# Patient Record
Sex: MALE | Race: WHITE | NOT HISPANIC OR LATINO | URBAN - METROPOLITAN AREA
[De-identification: names, ages, dates, MRNs, and addresses within clinical notes are randomized per-mention and may not be internally consistent; named-entity substitution may affect disease eponyms.]

---

## 2017-09-01 ENCOUNTER — HOSPITAL ENCOUNTER (OUTPATIENT)
Facility: MEDICAL CENTER | Age: 67
End: 2017-09-01

## 2017-09-01 ENCOUNTER — RESOLUTE PROFESSIONAL BILLING HOSPITAL PROF FEE (OUTPATIENT)
Dept: HOSPITALIST | Facility: MEDICAL CENTER | Age: 67
End: 2017-09-01
Payer: COMMERCIAL

## 2017-09-01 ENCOUNTER — HOSPITAL ENCOUNTER (INPATIENT)
Facility: MEDICAL CENTER | Age: 67
LOS: 1 days | DRG: 086 | End: 2017-09-02
Attending: HOSPITALIST | Admitting: NEUROLOGICAL SURGERY
Payer: COMMERCIAL

## 2017-09-01 DIAGNOSIS — R53.1 GENERALIZED WEAKNESS: ICD-10-CM

## 2017-09-01 PROCEDURE — A9270 NON-COVERED ITEM OR SERVICE: HCPCS | Performed by: HOSPITALIST

## 2017-09-01 PROCEDURE — 770001 HCHG ROOM/CARE - MED/SURG/GYN PRIV*

## 2017-09-01 PROCEDURE — 700102 HCHG RX REV CODE 250 W/ 637 OVERRIDE(OP): Performed by: HOSPITALIST

## 2017-09-01 PROCEDURE — 99223 1ST HOSP IP/OBS HIGH 75: CPT | Performed by: HOSPITALIST

## 2017-09-01 RX ORDER — HYDROCODONE BITARTRATE AND ACETAMINOPHEN 5; 325 MG/1; MG/1
1-2 TABLET ORAL EVERY 6 HOURS PRN
Status: DISCONTINUED | OUTPATIENT
Start: 2017-09-01 | End: 2017-09-02

## 2017-09-01 RX ORDER — HYDROCORTISONE AND ACETIC ACID 20.75; 10.375 MG/ML; MG/ML
2 SOLUTION AURICULAR (OTIC) PRN
COMMUNITY

## 2017-09-01 RX ORDER — MORPHINE SULFATE 4 MG/ML
1 INJECTION, SOLUTION INTRAMUSCULAR; INTRAVENOUS EVERY 4 HOURS PRN
Status: DISCONTINUED | OUTPATIENT
Start: 2017-09-01 | End: 2017-09-02

## 2017-09-01 RX ORDER — LISINOPRIL 20 MG/1
20 TABLET ORAL DAILY
Status: DISCONTINUED | OUTPATIENT
Start: 2017-09-01 | End: 2017-09-02 | Stop reason: HOSPADM

## 2017-09-01 RX ORDER — LEVETIRACETAM 500 MG/1
500 TABLET ORAL 2 TIMES DAILY
Status: DISCONTINUED | OUTPATIENT
Start: 2017-09-01 | End: 2017-09-02 | Stop reason: HOSPADM

## 2017-09-01 RX ORDER — BISACODYL 10 MG
10 SUPPOSITORY, RECTAL RECTAL
Status: DISCONTINUED | OUTPATIENT
Start: 2017-09-01 | End: 2017-09-02 | Stop reason: HOSPADM

## 2017-09-01 RX ORDER — ASPIRIN 81 MG/1
81 TABLET, CHEWABLE ORAL DAILY
COMMUNITY

## 2017-09-01 RX ORDER — LISINOPRIL 20 MG/1
20 TABLET ORAL DAILY
COMMUNITY

## 2017-09-01 RX ORDER — ONDANSETRON 4 MG/1
4 TABLET, ORALLY DISINTEGRATING ORAL EVERY 4 HOURS PRN
Status: DISCONTINUED | OUTPATIENT
Start: 2017-09-01 | End: 2017-09-02 | Stop reason: HOSPADM

## 2017-09-01 RX ORDER — LATANOPROST 50 UG/ML
1 SOLUTION/ DROPS OPHTHALMIC NIGHTLY
COMMUNITY

## 2017-09-01 RX ORDER — ONDANSETRON 2 MG/ML
4 INJECTION INTRAMUSCULAR; INTRAVENOUS EVERY 4 HOURS PRN
Status: DISCONTINUED | OUTPATIENT
Start: 2017-09-01 | End: 2017-09-02 | Stop reason: HOSPADM

## 2017-09-01 RX ORDER — CLONIDINE HYDROCHLORIDE 0.1 MG/1
0.1 TABLET ORAL 4 TIMES DAILY PRN
Status: DISCONTINUED | OUTPATIENT
Start: 2017-09-01 | End: 2017-09-02

## 2017-09-01 RX ORDER — SODIUM PHOSPHATE,MONO-DIBASIC 19G-7G/118
500 ENEMA (ML) RECTAL
COMMUNITY

## 2017-09-01 RX ORDER — POLYETHYLENE GLYCOL 3350 17 G/17G
1 POWDER, FOR SOLUTION ORAL
Status: DISCONTINUED | OUTPATIENT
Start: 2017-09-01 | End: 2017-09-02 | Stop reason: HOSPADM

## 2017-09-01 RX ORDER — AMOXICILLIN 250 MG
2 CAPSULE ORAL 2 TIMES DAILY
Status: DISCONTINUED | OUTPATIENT
Start: 2017-09-01 | End: 2017-09-02 | Stop reason: HOSPADM

## 2017-09-01 RX ADMIN — STANDARDIZED SENNA CONCENTRATE AND DOCUSATE SODIUM 2 TABLET: 8.6; 5 TABLET, FILM COATED ORAL at 21:44

## 2017-09-01 RX ADMIN — LEVETIRACETAM 500 MG: 500 TABLET, FILM COATED ORAL at 21:44

## 2017-09-01 RX ADMIN — LISINOPRIL 20 MG: 20 TABLET ORAL at 21:44

## 2017-09-01 ASSESSMENT — LIFESTYLE VARIABLES
TOTAL SCORE: 0
ALCOHOL_USE: YES
AVERAGE NUMBER OF DAYS PER WEEK YOU HAVE A DRINK CONTAINING ALCOHOL: 1
HOW MANY TIMES IN THE PAST YEAR HAVE YOU HAD 5 OR MORE DRINKS IN A DAY: 0
EVER FELT BAD OR GUILTY ABOUT YOUR DRINKING: NO
ON A TYPICAL DAY WHEN YOU DRINK ALCOHOL HOW MANY DRINKS DO YOU HAVE: 2
HAVE YOU EVER FELT YOU SHOULD CUT DOWN ON YOUR DRINKING: NO
TOTAL SCORE: 0
HAVE PEOPLE ANNOYED YOU BY CRITICIZING YOUR DRINKING: NO
TOTAL SCORE: 0
EVER HAD A DRINK FIRST THING IN THE MORNING TO STEADY YOUR NERVES TO GET RID OF A HANGOVER: NO
CONSUMPTION TOTAL: NEGATIVE
EVER_SMOKED: NEVER

## 2017-09-01 ASSESSMENT — ENCOUNTER SYMPTOMS
NEUROLOGICAL NEGATIVE: 1
GASTROINTESTINAL NEGATIVE: 1
EYES NEGATIVE: 1
MUSCULOSKELETAL NEGATIVE: 1
RESPIRATORY NEGATIVE: 1
CARDIOVASCULAR NEGATIVE: 1
CONSTITUTIONAL NEGATIVE: 1

## 2017-09-01 ASSESSMENT — PATIENT HEALTH QUESTIONNAIRE - PHQ9
1. LITTLE INTEREST OR PLEASURE IN DOING THINGS: NOT AT ALL
SUM OF ALL RESPONSES TO PHQ9 QUESTIONS 1 AND 2: 0
SUM OF ALL RESPONSES TO PHQ QUESTIONS 1-9: 0
2. FEELING DOWN, DEPRESSED, IRRITABLE, OR HOPELESS: NOT AT ALL

## 2017-09-01 ASSESSMENT — PAIN SCALES - GENERAL: PAINLEVEL_OUTOF10: 3

## 2017-09-01 NOTE — PROGRESS NOTES
Direct admit from [facility], Dr. Ramos, [phone].  Accepted by Dr. Moraes for Subdural hemotoma,  And s/p TKA.  Dr. Hassan of Neurosurgery will consult.  ADT signed & held @ 2240, needs to be released upon pt arrival.  No written orders received.  Pt coming by ground.

## 2017-09-01 NOTE — PROGRESS NOTES
Medical records received from [facility]:  Consults, Operative notes, and Demographics.  Scanned into Media tab.

## 2017-09-02 ENCOUNTER — APPOINTMENT (OUTPATIENT)
Dept: RADIOLOGY | Facility: MEDICAL CENTER | Age: 67
DRG: 086 | End: 2017-09-02
Attending: NEUROLOGICAL SURGERY
Payer: COMMERCIAL

## 2017-09-02 ENCOUNTER — APPOINTMENT (OUTPATIENT)
Dept: RADIOLOGY | Facility: MEDICAL CENTER | Age: 67
DRG: 086 | End: 2017-09-02
Attending: HOSPITALIST
Payer: COMMERCIAL

## 2017-09-02 VITALS
TEMPERATURE: 98.4 F | OXYGEN SATURATION: 97 % | HEART RATE: 79 BPM | HEIGHT: 72 IN | DIASTOLIC BLOOD PRESSURE: 51 MMHG | SYSTOLIC BLOOD PRESSURE: 122 MMHG | WEIGHT: 244.27 LBS | RESPIRATION RATE: 16 BRPM | BODY MASS INDEX: 33.09 KG/M2

## 2017-09-02 PROBLEM — R53.1 GENERALIZED WEAKNESS: Status: ACTIVE | Noted: 2017-09-02

## 2017-09-02 LAB
ALBUMIN SERPL BCP-MCNC: 3.1 G/DL (ref 3.2–4.9)
ALBUMIN/GLOB SERPL: 1.3 G/DL
ALP SERPL-CCNC: 44 U/L (ref 30–99)
ALT SERPL-CCNC: 14 U/L (ref 2–50)
ANION GAP SERPL CALC-SCNC: 9 MMOL/L (ref 0–11.9)
AST SERPL-CCNC: 16 U/L (ref 12–45)
BILIRUB SERPL-MCNC: 0.8 MG/DL (ref 0.1–1.5)
BUN SERPL-MCNC: 15 MG/DL (ref 8–22)
CALCIUM SERPL-MCNC: 8.5 MG/DL (ref 8.5–10.5)
CHLORIDE SERPL-SCNC: 106 MMOL/L (ref 96–112)
CO2 SERPL-SCNC: 27 MMOL/L (ref 20–33)
CREAT SERPL-MCNC: 0.73 MG/DL (ref 0.5–1.4)
ERYTHROCYTE [DISTWIDTH] IN BLOOD BY AUTOMATED COUNT: 41.5 FL (ref 35.9–50)
FOLATE SERPL-MCNC: 18.1 NG/ML
GFR SERPL CREATININE-BSD FRML MDRD: >60 ML/MIN/1.73 M 2
GLOBULIN SER CALC-MCNC: 2.4 G/DL (ref 1.9–3.5)
GLUCOSE SERPL-MCNC: 94 MG/DL (ref 65–99)
HCT VFR BLD AUTO: 25.9 % (ref 42–52)
HGB BLD-MCNC: 8.9 G/DL (ref 14–18)
IRON SATN MFR SERPL: 12 % (ref 15–55)
IRON SERPL-MCNC: 31 UG/DL (ref 50–180)
MCH RBC QN AUTO: 31.8 PG (ref 27–33)
MCHC RBC AUTO-ENTMCNC: 34.4 G/DL (ref 33.7–35.3)
MCV RBC AUTO: 92.5 FL (ref 81.4–97.8)
PLATELET # BLD AUTO: 217 K/UL (ref 164–446)
PMV BLD AUTO: 10.6 FL (ref 9–12.9)
POTASSIUM SERPL-SCNC: 3.4 MMOL/L (ref 3.6–5.5)
PROT SERPL-MCNC: 5.5 G/DL (ref 6–8.2)
RBC # BLD AUTO: 2.8 M/UL (ref 4.7–6.1)
SODIUM SERPL-SCNC: 142 MMOL/L (ref 135–145)
TIBC SERPL-MCNC: 249 UG/DL (ref 250–450)
VIT B12 SERPL-MCNC: 457 PG/ML (ref 211–911)
WBC # BLD AUTO: 8.6 K/UL (ref 4.8–10.8)

## 2017-09-02 PROCEDURE — 36415 COLL VENOUS BLD VENIPUNCTURE: CPT

## 2017-09-02 PROCEDURE — 83550 IRON BINDING TEST: CPT

## 2017-09-02 PROCEDURE — A9270 NON-COVERED ITEM OR SERVICE: HCPCS | Performed by: HOSPITALIST

## 2017-09-02 PROCEDURE — 80053 COMPREHEN METABOLIC PANEL: CPT

## 2017-09-02 PROCEDURE — 97162 PT EVAL MOD COMPLEX 30 MIN: CPT

## 2017-09-02 PROCEDURE — G8987 SELF CARE CURRENT STATUS: HCPCS | Mod: CI

## 2017-09-02 PROCEDURE — G8988 SELF CARE GOAL STATUS: HCPCS | Mod: CI

## 2017-09-02 PROCEDURE — 99239 HOSP IP/OBS DSCHRG MGMT >30: CPT | Performed by: HOSPITALIST

## 2017-09-02 PROCEDURE — G8989 SELF CARE D/C STATUS: HCPCS | Mod: CI

## 2017-09-02 PROCEDURE — 83540 ASSAY OF IRON: CPT

## 2017-09-02 PROCEDURE — 700102 HCHG RX REV CODE 250 W/ 637 OVERRIDE(OP): Performed by: HOSPITALIST

## 2017-09-02 PROCEDURE — 85027 COMPLETE CBC AUTOMATED: CPT

## 2017-09-02 PROCEDURE — 70450 CT HEAD/BRAIN W/O DYE: CPT

## 2017-09-02 PROCEDURE — G8980 MOBILITY D/C STATUS: HCPCS | Mod: CI

## 2017-09-02 PROCEDURE — 82746 ASSAY OF FOLIC ACID SERUM: CPT

## 2017-09-02 PROCEDURE — 82607 VITAMIN B-12: CPT

## 2017-09-02 PROCEDURE — 97165 OT EVAL LOW COMPLEX 30 MIN: CPT

## 2017-09-02 PROCEDURE — G8979 MOBILITY GOAL STATUS: HCPCS | Mod: CI

## 2017-09-02 PROCEDURE — G8978 MOBILITY CURRENT STATUS: HCPCS | Mod: CI

## 2017-09-02 RX ORDER — HYDROCODONE BITARTRATE AND ACETAMINOPHEN 5; 325 MG/1; MG/1
1-2 TABLET ORAL EVERY 6 HOURS PRN
Status: DISCONTINUED | OUTPATIENT
Start: 2017-09-02 | End: 2017-09-02 | Stop reason: HOSPADM

## 2017-09-02 RX ORDER — ACETAMINOPHEN 325 MG/1
650 TABLET ORAL EVERY 6 HOURS PRN
Status: DISCONTINUED | OUTPATIENT
Start: 2017-09-02 | End: 2017-09-02 | Stop reason: HOSPADM

## 2017-09-02 RX ORDER — ACETAMINOPHEN 325 MG/1
650 TABLET ORAL EVERY 6 HOURS PRN
Qty: 30 TAB | Refills: 0 | COMMUNITY
Start: 2017-09-02

## 2017-09-02 RX ORDER — MORPHINE SULFATE 4 MG/ML
1-3 INJECTION, SOLUTION INTRAMUSCULAR; INTRAVENOUS EVERY 4 HOURS PRN
Status: DISCONTINUED | OUTPATIENT
Start: 2017-09-02 | End: 2017-09-02 | Stop reason: HOSPADM

## 2017-09-02 RX ADMIN — HYDROCODONE BITARTRATE AND ACETAMINOPHEN 2 TABLET: 5; 325 TABLET ORAL at 15:24

## 2017-09-02 RX ADMIN — LEVETIRACETAM 500 MG: 500 TABLET, FILM COATED ORAL at 09:20

## 2017-09-02 RX ADMIN — STANDARDIZED SENNA CONCENTRATE AND DOCUSATE SODIUM 2 TABLET: 8.6; 5 TABLET, FILM COATED ORAL at 09:20

## 2017-09-02 RX ADMIN — HYDROCODONE BITARTRATE AND ACETAMINOPHEN 2 TABLET: 5; 325 TABLET ORAL at 00:40

## 2017-09-02 RX ADMIN — LISINOPRIL 20 MG: 20 TABLET ORAL at 09:21

## 2017-09-02 RX ADMIN — HYDROCODONE BITARTRATE AND ACETAMINOPHEN 1 TABLET: 5; 325 TABLET ORAL at 09:20

## 2017-09-02 ASSESSMENT — COGNITIVE AND FUNCTIONAL STATUS - GENERAL
WALKING IN HOSPITAL ROOM: A LITTLE
DRESSING REGULAR LOWER BODY CLOTHING: A LITTLE
SUGGESTED CMS G CODE MODIFIER MOBILITY: CJ
CLIMB 3 TO 5 STEPS WITH RAILING: A LITTLE
HELP NEEDED FOR BATHING: A LITTLE
MOBILITY SCORE: 22
DAILY ACTIVITIY SCORE: 22
SUGGESTED CMS G CODE MODIFIER DAILY ACTIVITY: CJ

## 2017-09-02 ASSESSMENT — PAIN SCALES - GENERAL
PAINLEVEL_OUTOF10: 4
PAINLEVEL_OUTOF10: 3

## 2017-09-02 ASSESSMENT — GAIT ASSESSMENTS
GAIT LEVEL OF ASSIST: STAND BY ASSIST
DEVIATION: STEP TO;BRADYKINETIC;DECREASED HEEL STRIKE
ASSISTIVE DEVICE: FRONT WHEEL WALKER
DISTANCE (FEET): 150

## 2017-09-02 ASSESSMENT — ACTIVITIES OF DAILY LIVING (ADL): TOILETING: INDEPENDENT

## 2017-09-02 NOTE — H&P
DATE OF ADMISSION:  09/01/2017    CHIEF COMPLAINT:  Post-fall with subdural hematomas, [facility].    HISTORY OF PRESENT ILLNESS:  Patient is a 67-year-old male with history of   hypertension, asthma, osteoarthritis of knees, post-elective left total knee   replacement this past Monday, [facility] with   head laceration and findings of subdural hematoma.  Patient reportedly after   his knee replacement surgery on Monday had not eaten anything all day, was   weak.  He with the nurse next to him, reached over bed, falling out, striking   his left forehead on to floor.  The patient reportedly momentarily lost   consciousness.  He reports of mild headache, reports no numbness or tingling.    He has left knee pain and swelling which has improved.  He has been   ambulatory with use of walker.  No reported nausea or vomiting, change in   vision or hearing.  His left forehead laceration is since sutured with mild   swelling and bleeding stopped.    Patient had initial CT of the head on 08/29/2017 revealing a small 4 mm thick   subdural hematoma, left frontal temporal lobe.  Also, soft tissue hematoma   left frontal skull.  A followup CT scan yesterday reveals bifrontal extraaxial   fluid collection mildly more prominent, representing a mixed subacute and   chronic subdural hematoma.  Patient was transferred to [facility] for higher   level of care and for neurosurgery evaluation.  Dr. Hassan was contacted earlier   to evaluate.    REVIEW OF SYSTEMS:  As above, otherwise negative according to AMA and CMS   criteria.    PAST MEDICAL HISTORY:  Includes hypertension, asthma, osteoarthritis of knees,   dyslipidemia, depression, history of obstructive sleep apnea, glaucoma.    PAST SURGICAL HISTORY:  Bilateral inguinal hernia repair, left total knee   replacement this past Monday per Dr. Ramos.    HOME MEDICATIONS:  Include aspirin 81 daily, glucosamine  500 mg t.i.d.,   lisinopril 20 daily, VoSol ear drops as needed.    ALLERGIES:  No known drug allergies.    SOCIAL HISTORY:  Reports never smoked, occasional alcohol, lives with his   wife, has remarried past 6 months.    FAMILY HISTORY:  None reported.    PHYSICAL EXAMINATION:  VITAL SIGNS:  Current vitals, temperature of 36.8, pulse 90s, respiratory rate   16, 97% on room air, blood pressure 130/85, 110 kilograms.  GENERAL:  The patient was alert, appropriate, oriented, and appeared in no   apparent distress.  HEENT:  Anicteric.  Extraocular movements are intact.  Mucous membranes were   moist.  There is no facial droop.  His left forehead with _____ laceration   with sutures present.  Mild swelling, no overlying warmth, dry.  NECK:  No cervical or supraclavicular adenopathy.  Trachea is midline.  CARDIOVASCULAR:  Regular rate and rhythm.  No murmurs.  LUNGS:  Clear to auscultation bilaterally, normal chest wall excursion effort.  ABDOMEN:  Bowel sounds present, soft, nontender, nondistended.  No   hepatosplenomegaly, no pulsatile masses, obese.  BACK:  No CVA or paraspinal tenderness.  EXTREMITIES:  His left knee with surgical incision with staples present.    There is moderate swelling, mild erythema, no increased warmth, decreased   range of motion due to the swelling, was dry, no peripheral cyanosis.  NEUROLOGIC:  Cranial nerves are grossly intact.  No pronator drift or   dysmetria.  No focal extremity weakness.  SKIN:  Warm, dry without pallor.  PSYCHIATRIC:  Calm and cooperative without depressed affect.    LABORATORY DATA:  [facility] reveals sodium 141,   potassium 3.4, chloride 108, bicarbonate 27, BUN and creatinine are 17 and   1.0, blood sugar of 108, total bilirubin of 0.5, white count 8.6, hemoglobin   8.4, platelet count 166.    IMPRESSION AND PLAN:  1.  Traumatic subdural hematoma.  Patient will be admitted acute to   neurosurgical floor.  Neurologically stable,  asymptomatic.  We will have   followup CT scan in a.m. to ensure stable.  High risk for seizure, will be   started on prophylactic Keppra.  Serial neuro checks q.  4 hours.    Neurosurgery, Dr. Hassan previously had accepted patient, to be contacted.    Avoid antiplatelets, blood thinners.  2.  Post left total knee replacement.  We will provide analgesics for pain.    Continue with physical therapy, occupational therapy, sequential compression   devices for deep venous thrombosis prophylaxis.  3.  Anemia, recent procedure loss.  We will follow up hemoglobin and transfuse   if less than 7 or hemodynamic instability, symptoms.  Follow up hemoglobin   and hematocrit.  4.  History of hypertension, currently fair control.  Resume lisinopril, add   p.r.n. clonidine.  5.  History of asthma, stable.  We will provide incentive spirometry, p.r.n.   bronchodilator therapy.    Greater than 2 midnights anticipated stay, acute admission.       ____________________________________     MD MICHAEL BOND / LINDA    DD:  09/01/2017 20:30:12  DT:  09/01/2017 21:20:00    D#:  [id number]  Job#:  [id number]

## 2017-09-02 NOTE — PROGRESS NOTES
Progress Note               Author: Jerson Hassan Date & Time created: 2017  10:26 AM     Interval History:  Denies any headaches overnight  OOB and ambulating to bathroom with assistance (no difficulty)    Review of Systems:  ROS    Physical Exam:  Physical Exam   Constitutional: He is oriented to person, place, and time.   Neurological: He is alert and oriented to person, place, and time. He has normal strength. No cranial nerve deficit or sensory deficit. GCS eye subscore is 4. GCS verbal subscore is 5. GCS motor subscore is 6.   Pain limited motor function of left lower extremity associated with recent knee surgery       Labs:        Invalid input(s): [id number]      Recent Labs      17   0242   SODIUM  142   POTASSIUM  3.4*   CHLORIDE  106   CO2  27   BUN  15   CREATININE  0.73   CALCIUM  8.5     Recent Labs      17   0242   ALTSGPT  14   ASTSGOT  16   ALKPHOSPHAT  44   TBILIRUBIN  0.8   GLUCOSE  94     Recent Labs      17   0242  17   0848   RBC  2.80*   --    HEMOGLOBIN  8.9*   --    HEMATOCRIT  25.9*   --    PLATELETCT  217   --    IRON   --   31*   TOTIRONBC   --   249*     Recent Labs      17   0242   WBC  8.6   ASTSGOT  16   ALTSGPT  14   ALKPHOSPHAT  44   TBILIRUBIN  0.8     Hemodynamics:  Temp (24hrs), Av.6 °C (97.9 °F), Min:36.2 °C (97.1 °F), Max:37.2 °C (98.9 °F)  Temperature: 36.3 °C (97.4 °F)  Pulse  Av.2  Min: 58  Max: 93   Blood Pressure : 119/59     Respiratory:    Respiration: 18, Pulse Oximetry: 96 %        RUL Breath Sounds: Clear, RML Breath Sounds: Clear, RLL Breath Sounds: Diminished, MARIANELA Breath Sounds: Clear, LLL Breath Sounds: Diminished  Fluids:    Intake/Output Summary (Last 24 hours) at 17 1026  Last data filed at 17 0600   Gross per 24 hour   Intake              150 ml   Output              200 ml   Net              -50 ml     Weight: 110.8 kg (244 lb 4.3 oz)  GI/Nutrition:  Orders Placed This Encounter   Procedures   • Diet  Order     Standing Status:   Standing     Number of Occurrences:   1     Order Specific Question:   Diet:     Answer:   Cardiac [6]     Medical Decision Making, by Problem:  There are no active hospital problems to display for this patient.    CT scan of the head reviewed this am.  There is no acute abnormality, particular no subdural hematoma.  There is some brain atrophy with increased subarachnoid space bifrontally, but without hematoma or hygroma.    Plan:  No neurosurgical issues  May discharge home per medicine service  May begin anticoagulation as appropriate s/p left total knee arthroplasty    Will s/o with no need for follow-up.  Please call with any further questions or concerns.     Quality-Core Measures

## 2017-09-02 NOTE — H&P
Physician H&P    Patient ID:  Shashi Rodriguez  [id number]  67 y.o. male  1950    History:  Primary Diagnosis: Subdural hematoma    HPI:  Shashi is a 67 year-old man who underwent a left total knee arthroplasty on Monday of this week (POD#4).  [facility] the patient states that on POD#1, he was sitting at the edge of the bed and attempting to urinate when he feel to the ground (appear that patient experienced a vagal response).  He feel to the ground hitting his head requiring stitches overlying the left aspect of the forehead. A workup was completed and CT scan along with repeat CT scan demonstrated a subdural hematoma (acute on chronic).  The patient has been transferred for evaluation and management.  In speaking with the patient's orthopedic surgeon earlier today, he reports that he has withheld anticoagulation over the patient's hospital course.    Past Medical History:  has no past medical history on file.  Past Surgical History:  has no past surgical history on file.  Past Social History:    Past Family History: No family history on file.  Allergies: Review of patient's allergies indicates no known allergies.    Current Medications:  Prior to Admission medications    Medication Sig Start Date End Date Taking? Authorizing Provider   aspirin (ASA) 81 MG Chew Tab chewable tablet Take 81 mg by mouth every day.   Yes Not In System Provider   glucosamine Sulfate 500 MG Cap Take 500 mg by mouth 3 times a day, with meals.   Yes Not In System Provider   acetic acid-hydrocortisone (VOSOL-HC) 1-2 % Solution Place 2 Drops in ear as needed.   Yes Not In System Provider   lisinopril (PRINIVIL) 20 MG Tab Take 20 mg by mouth every day.   Yes Not In System Provider   latanoprost (XALATAN) 0.005 % Solution Place 1 Drop in both eyes every evening.   Yes Not In System Provider       Review of Systems:  Review of Systems   Constitutional: Negative.    HENT: Negative.    Eyes: Negative.   "  Respiratory: Negative.    Cardiovascular: Negative.    Gastrointestinal: Negative.    Genitourinary: Negative.    Musculoskeletal: Negative.    Skin: Negative.    Neurological: Negative.      Blood pressure 128/59, pulse 86, temperature 37.2 °C (98.9 °F), resp. rate 16, height 1.829 m (6' 0.01\"), weight 110.8 kg (244 lb 4.3 oz), SpO2 97 %.    Physical Examination:  Physical Exam   Constitutional: He is oriented to person, place, and time. He appears well-developed.   HENT:   Sutures overlying left frontal region   Eyes: EOM are normal.   Cardiovascular: Normal rate.    Pulmonary/Chest: Effort normal.   Abdominal: Soft.   Musculoskeletal:   Swelling surrounding left knee and distal left lower extremity consistent with left total knee arthroplasty   Neurological: He is alert and oriented to person, place, and time. No cranial nerve deficit. Coordination normal. GCS eye subscore is 4. GCS verbal subscore is 5. GCS motor subscore is 6.   Negative pronator drift       Impression:  Reported 9 mm subdural hematoma, acute on chronic    Plan:  Continue with serial CT neurological examinations (q 2 hours)  Repeat CT scan of the head in the am  Hold anticoagulation (SCDs and LIOR hose for DVT prophylaxis)  OOB/Ambulate to BR with close observation/assistance  Follow BP (patient states low since surgery)    Pre Procedure Assessment:  <EXAMSHORT2>      Pre Procedure update:   No changes.    Jerson Hassan  9/1/2017  "

## 2017-09-02 NOTE — CARE PLAN
Problem: Safety  Goal: Will remain free from falls  Outcome: PROGRESSING AS EXPECTED  Pt resting in bed in low, locked position with call light within reach. Pt verbally acknowledges he will call before getting up. Bed alarm on.     Problem: Venous Thromboembolism (VTW)/Deep Vein Thrombosis (DVT) Prevention:  Goal: Patient will participate in Venous Thrombosis (VTE)/Deep Vein Thrombosis (DVT)Prevention Measures  Outcome: PROGRESSING AS EXPECTED  Pt wearing SCDs.

## 2017-09-02 NOTE — PROGRESS NOTES
Received report from day shift RN. Assumed care of patientN. Pt assessed and stable. VSS. Discussed plan of care for day with patient and received verbal understanding. Call light within reach, strip alarm active, bed in low position. All needs met at this time.

## 2017-09-02 NOTE — PROGRESS NOTES
2 RN skin assessment complete, no skin breakdown noted. Sutures to L forehead. C/D/I. Maximus to L knee DARIUS, C/D/I.

## 2017-09-02 NOTE — PROGRESS NOTES
Page from Dr. Moraes, requesting med rec, height and weight, and to page Dr. Hassan. Dr. Moraes will be down to see pt in 5-10 minutes.

## 2017-09-02 NOTE — CARE PLAN
Problem: Pain Management  Goal: Pain level will decrease to patient's comfort goal  Outcome: PROGRESSING AS EXPECTED  Patient expresses that pain is adequately managed with PRN pain medication. Will continue to monitor.    Problem: Mobility  Goal: Risk for activity intolerance will decrease  Outcome: PROGRESSING AS EXPECTED  Patient ambulates frequently.

## 2017-09-02 NOTE — THERAPY
"Occupational Therapy Evaluation completed.   Functional Status:  [facility] while attempting to use urinal sitting eob, subdural hematomas, stable from neurosurgery point of view. Pt was able to perform ADLs and functional mobility with supervision/sba, has all the required DME at home and will have assist from spouse as needed. Pt does not present the need for further acute skilled services at this time.   Plan of Care: Patient with no further skilled OT needs in the acute care setting at this time  Discharge Recommendations:  Equipment: No Equipment Needed. Post-acute therapy Discharge to home with outpatient or home health for additional skilled therapy services.    See \"Rehab Therapy-Acute\" Patient Summary Report for complete documentation.    "

## 2017-09-02 NOTE — FACE TO FACE
Face to Face Supporting Documentation - Home Health    The encounter with this patient was in whole or in part the primary reason for home health admission.    Date of encounter:   Patient:                    MRN:                       YOB: 2016  Shashi Rodriguez  [id number]  1950     Home health to see patient for:  Skilled Nursing care for assessment, interventions & education    Skilled need for:  Surgical Aftercare TKA    Skilled nursing interventions to include:  Comment: Pt education, PT/OT    Homebound status evidenced by:  Needs the assistance of another person in order to leave the home. Leaving home requires a considerable and taxing effort. There is a normal inability to leave the home.    Community Physician to provide follow up care: Pcp Pt States None     Optional Interventions? No      I certify the face to face encounter for this home health care referral meets the CMS requirements and the encounter/clinical assessment with the patient was, in whole, or in part, for the medical condition(s) listed above, which is the primary reason for home health care. Based on my clinical findings: the service(s) are medically necessary, support the need for home health care, and the homebound criteria are met.  I certify that this patient has had a face to face encounter by myself.  Justin House M.D. - NPI: [id number]

## 2017-09-02 NOTE — DISCHARGE SUMMARY
CHIEF COMPLAINT ON ADMISSION  No chief complaint on file.      CODE STATUS  Full Code    HPI & HOSPITAL COURSE  Patient is a 67-year-old male with history of   hypertension, asthma, osteoarthritis of knees, post-elective left total knee   replacement this past Monday, [facility] with   head laceration and findings of subdural hematoma.  Patient reportedly after   his knee replacement surgery on Monday had not eaten anything all day, was   weak.  He with the nurse next to him, reached over bed, falling out, striking   his left forehead on to floor.  The patient reportedly momentarily lost   consciousness.  He reports of mild headache, reports no numbness or tingling.    He has left knee pain and swelling which has improved.  He has been   ambulatory with use of walker.  No reported nausea or vomiting, change in   vision or hearing.  His left forehead laceration is since sutured with mild   swelling and bleeding stopped.     Patient had initial CT of the head on 08/29/2017 revealing a small 4 mm thick   subdural hematoma, left frontal temporal lobe.  Also, soft tissue hematoma   left frontal skull.  A followup CT scan yesterday reveals bifrontal extraaxial   fluid collection mildly more prominent, representing a mixed subacute and   chronic subdural hematoma.  Patient was transferred to [facility] for higher   level of care and for neurosurgery evaluation.  Dr. Hassan was contacted earlier   to evaluate.    He was monitored on NS floor and underwent repeat HCT.  The result showed no evidence of SDH.  NS recommended no surgical intervention and to resume the anticoagulation for the recent TKA.  His symptoms resolved overnight unexpectedly.  PT/OT evaluation deemed him appropriate for home c Home Health. HH referral has been sent.  At the time of the DC summary he was feeling much better, eating and drinking well and was hemodynamically stable.    Therefore, he is discharged in improved  and stable condition with close outpatient follow-up.      DISCHARGE PROBLEM LIST  TBI    FOLLOW UP  No future appointments.  Primary Care Provider  [facility]  Schedule an appointment as soon as possible for a visit in 2 weeks  Hospital follow-up appointment with PCP      MEDICATIONS ON DISCHARGE   Shashi Rodriguez   Home Medication Instructions AIDEN:[id number]    Printed on:09/02/17 1621   Medication Information                      acetaminophen (TYLENOL) 325 MG Tab  Take 2 Tabs by mouth every 6 hours as needed for Fever or Moderate Pain (Temp >101.5F).             acetic acid-hydrocortisone (VOSOL-HC) 1-2 % Solution  Place 2 Drops in ear as needed.             aspirin (ASA) 81 MG Chew Tab chewable tablet  Take 81 mg by mouth every day.             glucosamine Sulfate 500 MG Cap  Take 500 mg by mouth 3 times a day, with meals.             latanoprost (XALATAN) 0.005 % Solution  Place 1 Drop in both eyes every evening.             lisinopril (PRINIVIL) 20 MG Tab  Take 20 mg by mouth every day.                 DIET  Orders Placed This Encounter   Procedures   • Diet Order     Standing Status:   Standing     Number of Occurrences:   1     Order Specific Question:   Diet:     Answer:   Cardiac [6]       ACTIVITY  Gradual increase in activity per Home Health.      CONSULTATIONS  Dr. KULWANT Hassan (NS).    PROCEDURES  CT-HEAD W/O   Final Result      1.  Diffuse atrophy with increased extra-axial fluid space.   2.  No intracranial hemorrhage.  In particular, no subdural hematoma identified.   3.  No significant mass effect or midline shift.   4.  LEFT forehead scalp swelling.            LABORATORY  Lab Results   Component Value Date/Time    SODIUM 142 09/02/2017 02:42 AM    POTASSIUM 3.4 (L) 09/02/2017 02:42 AM    CHLORIDE 106 09/02/2017 02:42 AM    CO2 27 09/02/2017 02:42 AM    GLUCOSE 94 09/02/2017 02:42 AM    BUN 15 09/02/2017 02:42 AM    CREATININE 0.73 09/02/2017 02:42 AM        Lab Results   Component Value Date/Time     WBC 8.6 09/02/2017 02:42 AM    HEMOGLOBIN 8.9 (L) 09/02/2017 02:42 AM    HEMATOCRIT 25.9 (L) 09/02/2017 02:42 AM    PLATELETCT 217 09/02/2017 02:42 AM        Total time of the discharge process exceeds 38 min.

## 2017-09-03 NOTE — THERAPY
"Physical Therapy Evaluation completed.   Bed Mobility:  Supine to Sit: Supervised  Transfers: Sit to Stand: Supervised  Gait: Level Of Assist: Stand by Assist with Front-Wheel Walker       Plan of Care: Patient with no further skilled PT needs in the acute care setting at this time  Discharge Recommendations: Equipment: No Equipment Needed.     Pt presents with impaired activity tolerance and LLE swelling s/p GLF. Pt demonstrated needed mobility to return home. Educated pt on TKA ROM and stregth exercises and swelling management. Anticipate pt can return home when medically appropriate. No further acute PT needs.     See \"Rehab Therapy-Acute\" Patient Summary Report for complete documentation.     "

## 2017-09-03 NOTE — PROGRESS NOTES
Patient unable to wait for home health to be set up. Pt insists on discharging tonight. Dr. Harsh oliveros.

## 2017-09-03 NOTE — DISCHARGE INSTRUCTIONS
Discharge Instructions    Gradually increase activity. Weight bearing as tolerated. Follow [facility] with [facility] within [facility] weeks.    Open Wound, Head  An open wound is a break in the skin because of an injury. An open wound can be a scrape, cut, or puncture to the skin. Good wound care will help to:   · Reduce pain.  · Prevent infection.  · Reduce scaring.  HOME CARE  · Wash all dirt off the wound.  · Clean your wound daily with gentle soap and water.  · Wash your hair as you normally do.  · Apply medicated cream after the wound has been cleaned as told by your doctor.  · Apply a clean bandage (dressing) daily if needed.  GET HELP RIGHT AWAY IF:   · There is increased redness or puffiness (swelling) in or around the wound.  · Your pain increases.  · You or your child has a temperature by mouth above 102° F (38.9° C), not controlled by medicine.  · Your baby is older than 3 months with a rectal temperature of 102° F (38.9° C) or higher.  · Your baby is 3 months old or younger with a rectal temperature of 100.4° F (38° C) or higher.  · A yellowish white fluid (pus) comes from the wound.  · Your pain is not controlled with pain medicine.  · There is red streaking of the skin that goes above or below the wound.  MAKE SURE YOU:   · Understand these instructions.  · Will watch your condition.  · Will get help right away if you are not doing well or get worse.  Document Released: 03/16/2010 Document Revised: 03/11/2013 Document Reviewed: 03/16/2010  [employer]® Patient Information ©2014 [employer].    Total Knee Replacement, Care After  Refer to this sheet in the next few weeks. These instructions provide you with information on caring for yourself after your procedure. Your health care provider also may give you specific instructions. Your treatment has been planned according to the most current medical practices, but problems sometimes occur. Call your health care provider if you have any problems or  questions after your procedure.  HOME CARE INSTRUCTIONS   · See a physical therapist as directed by your health care provider.  · Take medicines only as directed by your health care provider.  · Avoid lifting or driving until you are instructed otherwise.  · If you have been sent home with a continuous passive motion machine, use it as directed by your health care provider.  SEEK MEDICAL CARE IF:  · You have difficulty breathing.  · You have drainage, redness, swelling, or pain at your incision site.  · You have a bad smell coming from your incision site.  · You have persistent bleeding from your incision site.  · Your incision breaks open after sutures (stitches) or staples have been removed.  · You have a fever.  SEEK IMMEDIATE MEDICAL CARE IF:   · You have a rash.  · You have pain or swelling in your calf or thigh.  · You have shortness of breath or chest pain.  · Your range of motion in your knee is decreasing rather than increasing.  MAKE SURE YOU:   · Understand these instructions.  · Will watch your condition.  · Will get help right away if you are not doing well or get worse.     This information is not intended to replace advice given to you by your health care provider. Make sure you discuss any questions you have with your health care provider.     Document Released: 07/07/2006 Document Revised: 01/08/2016 Document Reviewed: 02/05/2013  [employer] Interactive Patient Education ©2016 [employer]    Discharged to home by car with relative. Discharged via wheelchair, hospital escort: Yes.  Special equipment needed: Not Applicable    Be sure to schedule a follow-up appointment with your primary care doctor or any specialists as instructed.     Discharge Plan:   Diet Plan: Discussed  Activity Level: Discussed  Confirmed Follow up Appointment: Patient to Call and Schedule Appointment  Confirmed Symptoms Management: Discussed  Medication Reconciliation Updated: Yes  Influenza Vaccine Indication: Patient Refuses  (will [facility] with [facility])    I understand that a diet low in cholesterol, fat, and sodium is recommended for good health. Unless I have been given specific instructions below for another diet, I accept this instruction as my diet prescription.   Other diet: 2000 calorie Cardiac diet    Special Instructions: None    · Is patient discharged on Warfarin / Coumadin?   No     · Is patient Post Blood Transfusion?  No    Depression / Suicide Risk    As you are discharged from this [facility] facility, it is important to learn how to keep safe from harming yourself.    Recognize the warning signs:  · Abrupt changes in personality, positive or negative- including increase in energy   · Giving away possessions  · Change in eating patterns- significant weight changes-  positive or negative  · Change in sleeping patterns- unable to sleep or sleeping all the time   · Unwillingness or inability to communicate  · Depression  · Unusual sadness, discouragement and loneliness  · Talk of wanting to die  · Neglect of personal appearance   · Rebelliousness- reckless behavior  · Withdrawal from people/activities they love  · Confusion- inability to concentrate     If you or a loved one observes any of these behaviors or has concerns about self-harm, here's what you can do:  · Talk about it- your feelings and reasons for harming yourself  · Remove any means that you might use to hurt yourself (examples: pills, rope, extension cords, firearm)  · Get professional help from the community (Mental Health, Substance Abuse, psychological counseling)  · Do not be alone:Call your Safe Contact- someone whom you trust who will be there for you.  · Call your local CRISIS HOTLINE 747-2070 or 642-380-5657  · Call your local Children's Mobile Crisis Response Team [place] (386) 385-7370 or www.OKKAM  · Call the toll free National Suicide Prevention Hotlines   · National Suicide Prevention Lifeline 369-005-BZYR (1318)  · National Hope  Line Network 800-SUICIDE (486-8721)

## 2020-10-28 ENCOUNTER — HOSPITAL ENCOUNTER (OUTPATIENT)
Dept: RADIOLOGY | Facility: MEDICAL CENTER | Age: 70
End: 2020-10-28
Attending: HOSPITALIST

## 2020-10-28 ENCOUNTER — HOSPITAL ENCOUNTER (OUTPATIENT)
Dept: RADIOLOGY | Facility: MEDICAL CENTER | Age: 70
End: 2020-10-28
Attending: HOSPITALIST
Payer: COMMERCIAL

## 2020-10-28 DIAGNOSIS — R29.898 WEAKNESS OF RIGHT UPPER EXTREMITY: ICD-10-CM

## 2020-10-28 PROCEDURE — 72141 MRI NECK SPINE W/O DYE: CPT

## 2020-10-28 PROCEDURE — 70551 MRI BRAIN STEM W/O DYE: CPT

## 2021-03-03 DIAGNOSIS — Z23 NEED FOR VACCINATION: ICD-10-CM
